# Patient Record
(demographics unavailable — no encounter records)

---

## 2024-11-05 NOTE — DISCUSSION/SUMMARY
[FreeTextEntry1] : Images were used to review the findings of stage 3 uterovaginal prolapse, cystocele, rectocele with the patient and her son, Hemanth. Treatment options for the prolapse were reviewed and included observation, pelvic floor exercises with or without physical therapy, a pessary, or surgical correction. We discussed that she is the same stage as she was last year, which is expected as 80% of patients will remain the same stage in the following 16-18m. We discussed that despite having Stage 3 prolapse, she is still able to empty her bladder well as her PVR today was 25ml. If she were unable to empty her bladder, we discussed observation would no longer be an option as she could potentially have kidney damage from increased bladder pressures. Surgically we discussed the abdominal vs the vaginal routes. Abdominally we discussed a hysterectomy and a sacral colpopexy either via laparotomy or laparoscopically and robotically. Vaginally we discussed a vaginal hysterectomy and native tissue repair or obliterative procedure. Surgically we discussed hysteropexy as well as the use of biologics. If interested in surgery, we discussed obtaining a pelvic ultrasound, her prior cervical cancer screenings, and urodynamics.   We also reviewed her urinary symptoms and discussed possible etiologies including urinary tract infection and overactive bladder/urgency incontinence. Cath urine specimen was sent for culture today.  We discussed management options for overactive bladder including observation, fluid and behavioral modifications, bladder training, physical therapy and medications including anticholinergics and beta 3 agonists.  We discussed that soda is a bladder irritant since it has caffeine, carbonation, and artificial sweeteners. We discussed that reducing her soda and juice intake may improve her urinary symptoms even if she is unable to eliminate it completely. We discussed drinking at least 2-3 bottles of plain water slowly throughout the day and reviewed how binge drinking can worsen overactive bladder symptoms.  We discussed that there are many overactive bladder medications; however, none have been clearly shown to be superior to the others and they differ in their side effects. Side effects include dry eye, dry mouth, constipation, hypertension, dizziness, and memory problems. We discussed that we may need to trial a few medications to find one that is effective at controlling her symptoms with an acceptable side effect profile. We discussed third line treatment options including sacral neuromodulation, PTNS and intra detrusor Botox.   After extensive counseling with Zeina and her son, Zeina would like to continue with observation for her prolapse. She will work on fluid and behavioral modifications for her overactive bladder/urgency incontinence.   RTO in 6m for OAB/UUI and POP follow up

## 2024-11-05 NOTE — PHYSICAL EXAM
[Chaperone Present] : A chaperone was present in the examining room during all aspects of the physical examination [FreeTextEntry2] : Marilia [FreeTextEntry1] : General: Well, appearing. Alert and orientated. No acute distress\par  HEENT: Normocephalic, atraumatic and extraocular muscles appear to be intact \par  Neck: Full range of motion, no obvious lymphadenopathy, deformities, or masses noted \par  Respiratory: Speaking in full sentences comfortably, normal work of breathing and no cough during visit\par  Musculoskeletal: Ambulates with cane\par  Extremities: No upper extremity edema noted\par  Skin: no obvious rash or skin lesions\par  Neuro: Orientated X 3, speech is fluent, normal rate\par  Psych: Normal mood and affect

## 2024-11-05 NOTE — HISTORY OF PRESENT ILLNESS
[Cystocele (Obstetric)] : no [Vaginal Wall Prolapse] : no [Unable To Restrain Bowel Movement] : no [Urinary Frequency] : no [Urinary Tract Infection] : no [Feelings Of Urinary Urgency] : no [Pain During Urination (Dysuria)] : no [Constipation Obstructed Defecation] : no [Stool Visible Blood] : no [Incomplete Emptying Of Stool] : no [Pelvic Pain] : no [Vaginal Pain] : no [Rectal Pain] : no [] : no [FreeTextEntry5] : daily [de-identified] : once a week  [de-identified] : 8-10x/d  [de-identified] : 1x/n [de-identified] : daily [FreeTextEntry6] : Daily BM, soft  [FreeTextEntry1] : Zeina is an 79yo with Stage 3 uterovaginal prolapse, cystocele, rectocele and overactive bladder/urgency incontinence. She was previously using a pessary for many years; however, she had it removed and did not want to be refitted. She was last seen on 5/2/24 and opted for observation for her prolapse. She was also started on fluid and behavioral modifications for her OAB/UUI.   This past year she was hospitalized multiple times with infections, dehydration and kidney problems.   PMH: HTN, HLD, pre-DM, cataracts. Denies history of glaucoma  PSH: hemorrhoidectomy, tonsillectomy, b/l knee replacements, hip replacement, LSO for salpingectomy   Soc: Former smoker (1ppd x47y, quit at age 60),  for last 25y, not currently employed  All: NKDA   Daily fluid intake: 32oz soda + 32oz juice + 6-12oz water. Last drink at 10pm, goes to sleep at 10pm.